# Patient Record
Sex: FEMALE | URBAN - METROPOLITAN AREA
[De-identification: names, ages, dates, MRNs, and addresses within clinical notes are randomized per-mention and may not be internally consistent; named-entity substitution may affect disease eponyms.]

---

## 2023-07-12 ENCOUNTER — APPOINTMENT (OUTPATIENT)
Dept: URBAN - METROPOLITAN AREA SURGERY 20 | Age: 58
Setting detail: DERMATOLOGY
End: 2023-07-12

## 2023-07-12 VITALS — SYSTOLIC BLOOD PRESSURE: 134 MMHG | HEART RATE: 71 BPM | TEMPERATURE: 97.3 F | DIASTOLIC BLOOD PRESSURE: 62 MMHG

## 2023-07-12 VITALS — HEART RATE: 78 BPM | SYSTOLIC BLOOD PRESSURE: 122 MMHG | TEMPERATURE: 97.3 F | DIASTOLIC BLOOD PRESSURE: 67 MMHG

## 2023-07-12 PROBLEM — C44.1122 BASAL CELL CARCINOMA OF SKIN OF RIGHT LOWER EYELID, INCLUDING CANTHUS: Status: ACTIVE | Noted: 2023-07-12

## 2023-07-12 PROCEDURE — OTHER MIPS QUALITY: OTHER

## 2023-07-12 PROCEDURE — OTHER MOHS SURGERY: OTHER

## 2023-07-12 PROCEDURE — 17311 MOHS 1 STAGE H/N/HF/G: CPT

## 2023-07-12 NOTE — PROCEDURE: MOHS SURGERY
291.8 Keystone Flap Text: Given the location of the defect, the surrounding tension, to facilitate healing, and the shape of the defect, a keystone flap was planned.  Using a sterile surgical marker, an appropriate keystone flap was drawn incorporating the defect, outlining the appropriate donor tissue and placing the expected incisions within the relaxed skin tension lines where possible. The area thus outlined was incised deep to adipose tissue with a #15 scalpel blade.  The skin margins were undermined to an appropriate distance in all directions around the primary defect and laterally outward around the flap utilizing iris scissors.  The wound edges were sutured in a layered fashion.

## 2023-07-12 NOTE — PROCEDURE: MIPS QUALITY
Detail Level: Detailed
Quality 130: Documentation Of Current Medications In The Medical Record: Current Medications Documented
Quality 226: Preventive Care And Screening: Tobacco Use: Screening And Cessation Intervention: Patient screened for tobacco use and is an ex/non-smoker
Quality 400a: One-Time Screening For Hepatitis C Virus (Hcv) For All Patients: Patient received one-time screening for HCV infection
Quality 110: Preventive Care And Screening: Influenza Immunization: Influenza Immunization previously received during influenza season
Quality 111:Pneumonia Vaccination Status For Older Adults: Patient received any pneumococcal conjugate or polysaccharide vaccine on or after their 60th birthday and before the end of the measurement period